# Patient Record
Sex: MALE | Race: WHITE | Employment: OTHER | URBAN - METROPOLITAN AREA
[De-identification: names, ages, dates, MRNs, and addresses within clinical notes are randomized per-mention and may not be internally consistent; named-entity substitution may affect disease eponyms.]

---

## 2019-11-11 NOTE — H&P
His cardiologist faxed a response to my request, I have sent his comments via Fax to the endoscopy department. Date: 10/18/2019 2:00 PM   Patient Name: Fern Huang. Account #: [id number]    Gender: Male    (age): 1945 (73)       Provider:     Wesly Dunaway. Shayy Quinones MD        Referring Physician:     Shun Mondragon. Lissa [address]  [phone] (phone)  [phone] (fax)        Chief Complaint: Anemia           History of Present Illness:   81-year-old male with a history of  intestines comes in with progressiveanemia. He denies mariela hematochezia or melena. His current hemoglobin is 9, 2 months ago was 11. Angiodysplasia of the intestine now with progressive anemia. Last coronary stent was more than a year ago. He does have a defibrillator and asked such a pill camera study cannot be done. We negotiated that he would have up her GI endoscopy and colonoscopy. ? 81-year-old male with a history of  intestines comes in with progressiveanemia. He denies mariela hematochezia or melena. His current hemoglobin is 9, 2 months ago was 11. Angiodysplasia of the intestine now with progressive anemia. Last coronary stent was more than a year ago. He does have a defibrillator and asked such a pill camera study cannot be done. We negotiated that he would have up her GI endoscopy and colonoscopy. ? Past Medical History      Medical Conditions: Anemia  C.O.P.D.   Congestive Heart Failure  Pacemaker  S/P Defibralltor  Sleep apnea  Uses CPAP   Surgical Procedures: Knee Surgery, 14  Neck surgery,    Dx Studies: Colonoscopy,   Colonoscopy, 14  EGD, 17  Endoscopy, 14  M2A, 11  Pre-Procedure Call, 2017  Pre-Procedure Call, 10/24/2014   Medications: Adult Low Dose Aspirin 81 mg  carvedilol 25 mg  escitalopram oxalate 10 mg  lisinopril 40 mg 1 Daily  multivitamin  pantoprazole 40 mg TAKE 1 TABLET BY MOUTH ONCE A DAY  pramipexole 0.5 mg 1 tablet 4pm and 1/2 tablet 9pm  pravastatin 40 mg  Super B Complex + C 150 mg 1 daily  Tylenol Extra Strength 500 mg 2 tablets as needed   Allergies: aspirin   Immunizations: Influenza, seasonal, injectable, 2018  Pneumococcal conjugate PCV 13, 2018  Flu vaccine      Social History      Alcohol: Alcohol consumption: Weekly. Beer 4-5 3X a week. Tobacco: Former smoker   Drugs: None   Exercise: Exercise less than 3 times a week. Walking/ Running 2 times a week. Caffeine: 2-3. Coffee or Tea # of cups per day:. Daily. Marital Status:          Occupation:               Family History No history of Colon Cancer, Colon Polyps, Esophogeal Cancer, GI Cancers, IBD (Crohn's or UC), Liver disease        Review of Systems:   Cardiovascular: Denies chest pain, irregular heart beat, palpitations, peripheral edema, syncope, Sweats. Constitutional: Denies fatigue, fever, loss of appetite, weight gain, weight loss. ENMT: Denies nose bleeds, sore throat, hearing loss. Endocrine: Denies excessive thirst, heat intolerance. Eyes: Denies loss of vision. Gastrointestinal: Denies abdominal pain, abdominal swelling, change in bowel habits, constipation, diarrhea, Bloating/gas, heartburn, jaundice, nausea, rectal bleeding, stomach cramps, vomiting, dysphagia, rectal pain, Stool incontinence, hematemesis. Genitourinary: Denies dark urine, dysuria, frequent urination, hematuria, incontinence. Hematologic/Lymphatic: Denies easy bruising, prolonged bleeding. Integumentary: Denies itching, rashes, sun sensitivity. Musculoskeletal: Denies arthritis, back pain, gout, joint pain, muscle weakness, stiffness. Neurological: Denies dizziness, fainting, frequent headaches, memory loss. Psychiatric: Denies anxiety, depression, difficulty sleeping, hallucinations, nervousness, panic attacks, paranoia. Respiratory: Presents suffers from cough. Denies dyspnea, wheezing.       Vital Signs:   BP  (mmHg)  Pulse  (ppm) Weight (lbs/oz) Height (ft/in) BMI   155/82 65 247 / 0 5 / 8 37.55      Physical Exam:   Constitutional:      Appearance: No distress, appears comfortable. Communication: Understands/receives spoken information. Skin:      Inspection: No rash, no jaundice. Head/face: Inspection: Normacephalic, atraumatic. Eyes:      Conjunctivae/lids: Normal.   Pupils/irises: Pupils equal, round and normal.   ENMT:      External: Normal.   Hearing: Normal.   Neck:      Neck: Normal appearance, trachea midline. Jugular veins: No JVD noted. Respiratory:      Effort: Normal respiratory effort, comfortable, speaks in complete sentences. Musculoskeletal:      Gait/station: normal.   Digits/nails: Normal, no spooning of nails, clubbing, or splinter hemorrhages, no clubbing, cyanosis, petechiae or other inflammatory conditions. Psychiatric:      Judgment/insight: Normal, normal judgement, normal insight. Orientation: oriented to time, space and person. Lab Results:      Test 7/10/2019 6/2/2014 Units Limits   CBC, Platelet, No Differential       Hematocrit  36.2 % 37.5 - 51   Hemoglobin  10.7 g/dL 12.6 - 17.7   MCH  22.9 pg 26.6 - 33   MCHC  29.6 g/dL 31.5 - 35.7   MCV  77 fL 79 - 97   NRBC       Platelets  302 M78R1/ - 379   RBC  4.68 x10E6/uL 4.14 - 5.8   RDW  22.0 % 12.3 - 15.4   WBC  9.3 x10E3/uL 3.4 - 10.8   Iron and TIBC       Iron 67  ug/dL 38 - 169   Iron Bind. Cap.(TIBC) 438  ug/dL 250 - 450   Iron Saturation 15  % 15 - 55   UIBC 371  ug/dL 111 - 343     Impressions: Angiodysplasia of colon  Cardiomyopathy, unspecified  Anemia due to blood loss? ? Angiodysplasia of colon? ?  ? ? Cardiomyopathy, unspecified? ?  ? ? Anemia due to blood loss? Assessment: ?         Plan: Get name of and contact his cardiologist for records of most recent ejection fraction, and ask if they would be willing to do risk assessment for upcoming endoscopy/colonoscopy. Upper Endoscopy  Colonoscopy?  ?Get name of and contact his cardiologist for records of most recent ejection fraction, and ask if they would be willing to do risk assessment for upcoming endoscopy/colonoscopy. ? ?  ? ? Upper Endoscopy? ?  ? ? Colonoscopy? Risk & Medical Necessity: The patient requires Moderate to High Severity care for this visit. Diagnosis and management options are Minimal. The amount of data reviewed and/or ordered is Minimal/None. The level of risk is Minimal.           Notes:              Vamsi Cooley. Jasen Marr MD     Electronically signed on 10/18/2019 3:11:28 PM by Vamsi Cooley. MD Wojciech Sahu.  Israel Boldenster, MRN [id number],  1945 Follow Up, 2019                                                                                                                                                        New     Modify          Delete     Delete all     Edit Wording          Sign     [email or web address]

## 2019-11-12 ENCOUNTER — ANESTHESIA EVENT (OUTPATIENT)
Dept: ENDOSCOPY | Age: 74
End: 2019-11-12
Payer: MEDICARE

## 2019-11-12 ENCOUNTER — ANESTHESIA (OUTPATIENT)
Dept: ENDOSCOPY | Age: 74
End: 2019-11-12
Payer: MEDICARE

## 2019-11-12 ENCOUNTER — HOSPITAL ENCOUNTER (OUTPATIENT)
Age: 74
Setting detail: OUTPATIENT SURGERY
Discharge: HOME | End: 2019-11-12
Attending: SPECIALIST | Admitting: SPECIALIST
Payer: MEDICARE

## 2019-11-12 VITALS
BODY MASS INDEX: 37.32 KG/M2 | HEART RATE: 64 BPM | HEIGHT: 68 IN | TEMPERATURE: 97.8 F | RESPIRATION RATE: 28 BRPM | OXYGEN SATURATION: 99 % | WEIGHT: 246.25 LBS | DIASTOLIC BLOOD PRESSURE: 77 MMHG | SYSTOLIC BLOOD PRESSURE: 158 MMHG

## 2019-11-12 PROCEDURE — 77030010936 HC CLP LIG BSC -C: Performed by: SPECIALIST

## 2019-11-12 PROCEDURE — 74011000250 HC RX REV CODE- 250: Performed by: NURSE ANESTHETIST, CERTIFIED REGISTERED

## 2019-11-12 PROCEDURE — 74011250637 HC RX REV CODE- 250/637: Performed by: PHYSICIAN ASSISTANT

## 2019-11-12 PROCEDURE — 77030021593 HC FCPS BIOP ENDOSC BSC -A: Performed by: SPECIALIST

## 2019-11-12 PROCEDURE — 76040000007: Performed by: SPECIALIST

## 2019-11-12 PROCEDURE — 76060000032 HC ANESTHESIA 0.5 TO 1 HR: Performed by: SPECIALIST

## 2019-11-12 PROCEDURE — 74011250636 HC RX REV CODE- 250/636: Performed by: NURSE ANESTHETIST, CERTIFIED REGISTERED

## 2019-11-12 PROCEDURE — 77030022875 HC PRB AR PLSM COAG ERBE -C: Performed by: SPECIALIST

## 2019-11-12 PROCEDURE — 74011250636 HC RX REV CODE- 250/636: Performed by: PHYSICIAN ASSISTANT

## 2019-11-12 PROCEDURE — 88305 TISSUE EXAM BY PATHOLOGIST: CPT

## 2019-11-12 RX ORDER — FLUMAZENIL 0.1 MG/ML
0.2 INJECTION INTRAVENOUS
Status: DISCONTINUED | OUTPATIENT
Start: 2019-11-12 | End: 2019-11-12 | Stop reason: HOSPADM

## 2019-11-12 RX ORDER — PRAMIPEXOLE DIHYDROCHLORIDE 0.25 MG/1
0.25 TABLET ORAL DAILY
COMMUNITY
End: 2022-01-01

## 2019-11-12 RX ORDER — MIDAZOLAM HYDROCHLORIDE 1 MG/ML
.25-5 INJECTION, SOLUTION INTRAMUSCULAR; INTRAVENOUS AS NEEDED
Status: DISCONTINUED | OUTPATIENT
Start: 2019-11-12 | End: 2019-11-12 | Stop reason: HOSPADM

## 2019-11-12 RX ORDER — NALOXONE HYDROCHLORIDE 0.4 MG/ML
0.4 INJECTION, SOLUTION INTRAMUSCULAR; INTRAVENOUS; SUBCUTANEOUS
Status: DISCONTINUED | OUTPATIENT
Start: 2019-11-12 | End: 2019-11-12 | Stop reason: HOSPADM

## 2019-11-12 RX ORDER — ATROPINE SULFATE 0.1 MG/ML
0.5 INJECTION INTRAVENOUS
Status: DISCONTINUED | OUTPATIENT
Start: 2019-11-12 | End: 2019-11-12 | Stop reason: HOSPADM

## 2019-11-12 RX ORDER — HYDROCHLOROTHIAZIDE 25 MG/1
25 TABLET ORAL DAILY
COMMUNITY
End: 2022-01-01

## 2019-11-12 RX ORDER — SODIUM CHLORIDE 9 MG/ML
INJECTION, SOLUTION INTRAVENOUS
Status: DISCONTINUED | OUTPATIENT
Start: 2019-11-12 | End: 2019-11-12 | Stop reason: HOSPADM

## 2019-11-12 RX ORDER — PANTOPRAZOLE SODIUM 40 MG/1
40 TABLET, DELAYED RELEASE ORAL DAILY
COMMUNITY

## 2019-11-12 RX ORDER — LOSARTAN POTASSIUM 100 MG/1
100 TABLET ORAL DAILY
COMMUNITY

## 2019-11-12 RX ORDER — LIDOCAINE HYDROCHLORIDE 20 MG/ML
INJECTION, SOLUTION EPIDURAL; INFILTRATION; INTRACAUDAL; PERINEURAL AS NEEDED
Status: DISCONTINUED | OUTPATIENT
Start: 2019-11-12 | End: 2019-11-12 | Stop reason: HOSPADM

## 2019-11-12 RX ORDER — FENTANYL CITRATE 50 UG/ML
25 INJECTION, SOLUTION INTRAMUSCULAR; INTRAVENOUS AS NEEDED
Status: DISCONTINUED | OUTPATIENT
Start: 2019-11-12 | End: 2019-11-12 | Stop reason: HOSPADM

## 2019-11-12 RX ORDER — DEXTROMETHORPHAN/PSEUDOEPHED 2.5-7.5/.8
1.2 DROPS ORAL
Status: DISCONTINUED | OUTPATIENT
Start: 2019-11-12 | End: 2019-11-12 | Stop reason: HOSPADM

## 2019-11-12 RX ORDER — CARVEDILOL 25 MG/1
25 TABLET ORAL 2 TIMES DAILY WITH MEALS
COMMUNITY

## 2019-11-12 RX ORDER — ESCITALOPRAM OXALATE 10 MG/1
10 TABLET ORAL DAILY
COMMUNITY

## 2019-11-12 RX ORDER — EPINEPHRINE 0.1 MG/ML
1 INJECTION INTRACARDIAC; INTRAVENOUS
Status: DISCONTINUED | OUTPATIENT
Start: 2019-11-12 | End: 2019-11-12 | Stop reason: HOSPADM

## 2019-11-12 RX ORDER — ASPIRIN 81 MG/1
81 TABLET ORAL DAILY
COMMUNITY

## 2019-11-12 RX ORDER — SODIUM CHLORIDE 9 MG/ML
50 INJECTION, SOLUTION INTRAVENOUS CONTINUOUS
Status: DISCONTINUED | OUTPATIENT
Start: 2019-11-12 | End: 2019-11-12 | Stop reason: HOSPADM

## 2019-11-12 RX ORDER — PROPOFOL 10 MG/ML
INJECTION, EMULSION INTRAVENOUS
Status: DISCONTINUED | OUTPATIENT
Start: 2019-11-12 | End: 2019-11-12 | Stop reason: HOSPADM

## 2019-11-12 RX ORDER — ROSUVASTATIN CALCIUM 20 MG/1
20 TABLET, COATED ORAL
COMMUNITY

## 2019-11-12 RX ORDER — PRAMIPEXOLE DIHYDROCHLORIDE 0.5 MG/1
0.5 TABLET ORAL DAILY
COMMUNITY

## 2019-11-12 RX ORDER — BISMUTH SUBSALICYLATE 262 MG
1 TABLET,CHEWABLE ORAL DAILY
COMMUNITY

## 2019-11-12 RX ORDER — ACETAMINOPHEN 500 MG
500 TABLET ORAL
COMMUNITY

## 2019-11-12 RX ORDER — PROPOFOL 10 MG/ML
INJECTION, EMULSION INTRAVENOUS AS NEEDED
Status: DISCONTINUED | OUTPATIENT
Start: 2019-11-12 | End: 2019-11-12 | Stop reason: HOSPADM

## 2019-11-12 RX ADMIN — LIDOCAINE HYDROCHLORIDE 40 MG: 20 INJECTION, SOLUTION INTRAVENOUS at 11:07

## 2019-11-12 RX ADMIN — SODIUM CHLORIDE: 900 INJECTION, SOLUTION INTRAVENOUS at 11:02

## 2019-11-12 RX ADMIN — PROPOFOL 50 MG: 10 INJECTION, EMULSION INTRAVENOUS at 11:11

## 2019-11-12 RX ADMIN — SODIUM CHLORIDE 50 ML/HR: 900 INJECTION, SOLUTION INTRAVENOUS at 10:58

## 2019-11-12 RX ADMIN — SIMETHICONE 80 MG: 20 SUSPENSION/ DROPS ORAL at 11:19

## 2019-11-12 RX ADMIN — PROPOFOL 30 MG: 10 INJECTION, EMULSION INTRAVENOUS at 11:13

## 2019-11-12 RX ADMIN — PROPOFOL 30 MG: 10 INJECTION, EMULSION INTRAVENOUS at 11:08

## 2019-11-12 RX ADMIN — PROPOFOL 130 MCG/KG/MIN: 10 INJECTION, EMULSION INTRAVENOUS at 11:07

## 2019-11-12 NOTE — PROGRESS NOTES
[employer] on original products read heart monitor and [facility]

## 2019-11-12 NOTE — ANESTHESIA PREPROCEDURE EVALUATION
Relevant Problems   No relevant active problems       Anesthetic History   No history of anesthetic complications            Review of Systems / Medical History  Patient summary reviewed and pertinent labs reviewed    Pulmonary    COPD: moderate    Sleep apnea: CPAP      Pertinent negatives: No asthma     Neuro/Psych   Within defined limits           Cardiovascular    Hypertension: well controlled              Exercise tolerance: >4 METS     GI/Hepatic/Renal               Comments: GI Bleeding Endo/Other        Obesity     Other Findings              Physical Exam    Airway  Mallampati: III  TM Distance: 4 - 6 cm  Neck ROM: normal range of motion   Mouth opening: Normal     Cardiovascular    Rhythm: regular  Rate: normal         Dental    Dentition: Bridges, Lower partial plate and Full upper dentures     Pulmonary  Breath sounds clear to auscultation               Abdominal  GI exam deferred       Other Findings            Anesthetic Plan    ASA: 3  Anesthesia type: MAC          Induction: Intravenous  Anesthetic plan and risks discussed with: Patient

## 2019-11-12 NOTE — DISCHARGE INSTRUCTIONS
[facility] RACHEL Ling, [address] [place] [address]  [phone]      November 12, 2019    Barbara Barone. YOB: 1945    COLONOSCOPY DISCHARGE INSTRUCTIONS    If there is redness at IV site you should apply warm compress to area. If redness or soreness persist contact Dr. Earline Ling' or your primary care doctor. There may be a slight amount of blood passed from the rectum. Gaseous discomfort may develop, but walking, belching will help relieve this. You may not operate a vehicle for 12 hours  You may not operate machinery or dangerous appliances for rest of today  You may not drink alcoholic beverages for 12 hours  Avoid making any critical decisions for 24 hours    DIET:  You may resume your normal diet, but some patients find that heavy or large meals may lead to indigestion or vomiting. I suggest a light meal as first food intake. MEDICATIONS:  The use of some over-the-counter pain medication may lead to bleeding after colon biopsies or polyp removal.  Tylenol (also called acetaminophen) is safe to take even if you have had colonoscopy with polyp removal.  Based on the procedure you had today you may not safely take aspirin or aspirin-like products for the next ten (10) days. Remember that Tylenol (also called acetaminophen) is safe to take after colonoscopy even if you have had biopsies or polyps removed. ACTIVITY:  You may resume your normal household activities, but it is recommended that you spend the remainder of the day resting -  avoid any strenuous activity. CALL DR. Uriel Aguilera' OFFICE IF:  Increasing pain, nausea, vomiting  Abdominal distension (swelling)  Significant new or increased bleeding (oral or rectal)  Fever/Chills  Chest pain/shortness of breath                       Additional instructions:   Hold aspirin 10 days. We found what looks like a healed ulcer in the stomach but no bleeding there. There were two AVM in the colon which we ablated/cauterized. We'll see if this helps to improve your anemia, keep taking iron and have blood testing again in 8 weeks      It was an honor to be your doctor today. Please let me or my office staff know if you have any feedback about today's procedure. Veronica Olivera MD    Colonoscopy saves lives, and can prevent colon cancer. Everyone aged 48 or older needs colonoscopy.   Tell your family and friends: get the test!

## 2019-11-12 NOTE — ROUTINE PROCESS
Lavonne Morning.  1945  [id number]    Situation:  Verbal report received from: Army Channing RN  Procedure: Procedure(s):  COLONOSCOPY  ESOPHAGOGASTRODUODENOSCOPY (EGD)  ESOPHAGOGASTRODUODENAL (EGD) BIOPSY  ENDOSCOPIC ARGON PLASMA COAGULATION  RESOLUTION CLIP    Background:    Preoperative diagnosis: ANGIODYSPLASIA OF COLON, CARDIOMYPATHY  Postoperative diagnosis: EGD- gastritis  Colon- AVM of cecum, hemorrhoids, diverticulosis     :  Dr. Sivakumar Maldonado  Assistant(s): Endoscopy Technician-1: Adriano Roche  Endoscopy RN-1: Fernando Mondragon    Specimens:   ID Type Source Tests Collected by Time Destination   1 : Duodenum Biopsy Preservative   Diana Bay MD 11/12/2019 1112 Pathology   2 : Antrum Biopsy Preservative   Diana Bay MD 11/12/2019 1113 Pathology     H. Pylori  no    Assessment:  Intra-procedure medications     Anesthesia gave intra-procedure sedation and medications, see anesthesia flow sheet yes    Intravenous fluids: NS@ KVO     Vital signs stable     Abdominal assessment: round and soft     Recommendation:  Discharge patient per MD order.   Family or Friend   Permission to share finding with family or friend yes

## 2019-11-12 NOTE — ANESTHESIA POSTPROCEDURE EVALUATION
Procedure(s):  COLONOSCOPY  ESOPHAGOGASTRODUODENOSCOPY (EGD)  ESOPHAGOGASTRODUODENAL (EGD) BIOPSY  ENDOSCOPIC ARGON PLASMA COAGULATION  RESOLUTION CLIP. MAC    Anesthesia Post Evaluation      Multimodal analgesia: multimodal analgesia not used between 6 hours prior to anesthesia start to PACU discharge  Patient location during evaluation: PACU  Patient participation: complete - patient participated  Level of consciousness: awake and alert  Pain score: 0  Airway patency: patent  Anesthetic complications: no  Cardiovascular status: acceptable  Respiratory status: acceptable  Hydration status: acceptable  Post anesthesia nausea and vomiting:  none      Vitals Value Taken Time   /72 11/12/2019 12:06 PM   Temp 36.6 °C (97.8 °F) 11/12/2019 11:41 AM   Pulse 62 11/12/2019 12:07 PM   Resp 24 11/12/2019 12:07 PM   SpO2 99 % 11/12/2019 12:07 PM   Vitals shown include unvalidated device data.

## 2019-11-12 NOTE — PERIOP NOTES
1058- Patient resting comfortably on stretcher, HOB elevated, VS stable, call bell within reach, waiting for procedure start, will continue to monitor pt.    1106- Patient will be monitored and sedated by anesthesia for their procedure. See anesthesia note. 46- Endoscope was pre-cleaned at bedside immediately following procedure by Enolia Rubinstein, tech. 1137- Patient tolerated procedure without problems. Abdomen soft and patient arousable and voices no complaints Report received from CRNA, see anesthesia note. Patient transported to endoscopy recovery area via stretcher, report given to ROXANNA Steele.
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
## 2019-11-12 NOTE — PROCEDURES
[facility] D. Percy Gowers, [place]  [phone]      2019    Esophagogastroduodenoscopy & Colonoscopy Procedure Note  Colleen Briscoe. : 1945  [facility] Medical Record Number: [id number]      Indications:    Iron deficiency anemia History of AVM colon  Referring Physician:  Adilia Carroll MD  Anesthesia/Sedation: Conscious Sedation/Moderate Sedation/MAC  Endoscopist:  Dr. Felipa Tate  Complications:  None  Estimated Blood Loss:  None    Permit:  The indications, risks, benefits and alternatives were reviewed with the patient or their decision maker who was provided an opportunity to ask questions and all questions were answered. The specific risks of esophagogastroduodenoscopy with conscious sedation were reviewed, including but not limited to anesthetic complication, bleeding, adverse drug reaction, missed lesion, infection, IV site reactions, and intestinal perforation which would lead to the need for surgical repair. Alternatives to EGD and colonoscopy including radiographic imaging, observation without testing, or laboratory testing were reviewed as well as the limitations of those alternatives discussed. After considering the options and having all their questions answered, the patient or their decision maker provided both verbal and written consent to proceed. -----------EGD------------   Procedure in Detail:  After obtaining informed consent, positioning of the patient in the left lateral decubitus position, and conduction of a pre-procedure pause or \"time out\" the endoscope was introduced into the mouth and advanced to the duodenum. A careful inspection was made, and findings or interventions are described below. Findings:   Esophagus:normal  Stomach:  There is an area in the antrum with scarring consistent with previous peptic ulcer, and in this area there is mild mucosal erythema which is biopsied with cold forceps. Duodenum/jejunum: normal.  A biopsy was taken from the duodenal mucosa for evaluation of villous atrophy or giardiasis. Hemostasis is confirmed from biopsy sites. Hemostasis confirmed from each biopsy site.    ----------Colonoscopy-----------    Procedure in Detail:  After obtaining informed consent, positioning of the patient in the left lateral decubitus position, and conduction of a pre-procedure pause or \"time out\" the endoscope was introduced into the anus and advanced to the cecum, which was identified by the ileocecal valve and appendiceal orifice. The quality of the colonic preparation was good. A careful inspection was made as the colonoscope was withdrawn, findings and interventions are described below. Findings:   Two AVM of the cecum are identified. We used argon plasma to ablate these lesions then applied an endoclip to the larger of the two for bleeding control. Hemostasis confirmed. There is diverticulosis in the sigmoid colon without complications such as bleeding, inflammatory change, or luminal narrowing. In the rectum, medium internal hemorrhoids are noted without bleeding.      ------------------------------  Specimens:    See above    Complications:   None; patient tolerated the procedure well. Impressions:  EGD:  Healed ulcer with overlying gastritis, biopsied. Colonoscopy: AVM x2 ablated. Recommendations:      - PPI daily  -Await pathology  -See if today's efforts help to improve his anemia. Thank you for entrusting me with this patient's care. Please do not hesitate to contact me with any questions or if I can be of assistance with any of your other patients' GI needs. Signed By: Luis Gastelum MD                        November 12, 2019    Surgical assistant none. Implants none unless specified.

## 2022-01-01 ENCOUNTER — PATIENT MESSAGE (OUTPATIENT)
Dept: NEUROLOGY | Age: 77
End: 2022-01-01

## 2022-01-01 ENCOUNTER — TELEPHONE (OUTPATIENT)
Dept: NEUROLOGY | Age: 77
End: 2022-01-01

## 2022-01-01 ENCOUNTER — OFFICE VISIT (OUTPATIENT)
Dept: NEUROLOGY | Age: 77
End: 2022-01-01
Payer: MEDICARE

## 2022-01-01 ENCOUNTER — HOSPITAL ENCOUNTER (OUTPATIENT)
Dept: MRI IMAGING | Age: 77
Discharge: HOME | End: 2022-05-20
Payer: MEDICARE

## 2022-01-01 VITALS
SYSTOLIC BLOOD PRESSURE: 130 MMHG | DIASTOLIC BLOOD PRESSURE: 70 MMHG | BODY MASS INDEX: 37.44 KG/M2 | HEIGHT: 68 IN | HEART RATE: 62 BPM | OXYGEN SATURATION: 95 %

## 2022-01-01 VITALS
SYSTOLIC BLOOD PRESSURE: 138 MMHG | OXYGEN SATURATION: 97 % | RESPIRATION RATE: 17 BRPM | DIASTOLIC BLOOD PRESSURE: 84 MMHG | HEART RATE: 68 BPM

## 2022-01-01 DIAGNOSIS — R41.89 COGNITIVE IMPAIRMENT: Primary | ICD-10-CM

## 2022-01-01 DIAGNOSIS — R68.89 OTHER GENERAL SYMPTOMS AND SIGNS: ICD-10-CM

## 2022-01-01 DIAGNOSIS — R41.89 COGNITIVE IMPAIRMENT: ICD-10-CM

## 2022-01-01 LAB
FOLATE SERPL-MCNC: 18.4 NG/ML
TSH SERPL DL<=0.005 MIU/L-ACNC: 3.62 UIU/ML (ref 0.45–4.5)
VIT B12 SERPL-MCNC: 701 PG/ML (ref 232–1245)

## 2022-01-01 PROCEDURE — 1101F PT FALLS ASSESS-DOCD LE1/YR: CPT | Performed by: PSYCHIATRY & NEUROLOGY

## 2022-01-01 PROCEDURE — G8417 CALC BMI ABV UP PARAM F/U: HCPCS | Performed by: PSYCHIATRY & NEUROLOGY

## 2022-01-01 PROCEDURE — 99215 OFFICE O/P EST HI 40 MIN: CPT | Performed by: PSYCHIATRY & NEUROLOGY

## 2022-01-01 PROCEDURE — 3017F COLORECTAL CA SCREEN DOC REV: CPT | Performed by: PSYCHIATRY & NEUROLOGY

## 2022-01-01 PROCEDURE — G8510 SCR DEP NEG, NO PLAN REQD: HCPCS | Performed by: PSYCHIATRY & NEUROLOGY

## 2022-01-01 PROCEDURE — 70551 MRI BRAIN STEM W/O DYE: CPT

## 2022-01-01 PROCEDURE — G8427 DOCREV CUR MEDS BY ELIG CLIN: HCPCS | Performed by: PSYCHIATRY & NEUROLOGY

## 2022-01-01 PROCEDURE — 99205 OFFICE O/P NEW HI 60 MIN: CPT | Performed by: PSYCHIATRY & NEUROLOGY

## 2022-01-01 PROCEDURE — 1123F ACP DISCUSS/DSCN MKR DOCD: CPT | Performed by: PSYCHIATRY & NEUROLOGY

## 2022-01-01 PROCEDURE — G8536 NO DOC ELDER MAL SCRN: HCPCS | Performed by: PSYCHIATRY & NEUROLOGY

## 2022-01-01 RX ORDER — DIAZEPAM 2 MG/1
TABLET ORAL
Qty: 2 TABLET | Refills: 0 | Status: SHIPPED | OUTPATIENT
Start: 2022-01-01 | End: 2022-01-01

## 2022-01-01 RX ORDER — DONEPEZIL HYDROCHLORIDE 5 MG/1
5 TABLET, FILM COATED ORAL DAILY
Qty: 30 TABLET | Refills: 3 | Status: SHIPPED | OUTPATIENT
Start: 2022-01-01
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
## 2022-05-11 NOTE — LETTER
5/11/2022    Patient: Benja Myers. YOB: 1945   Date of Visit: 5/11/2022     Shiraz De La Rosa MD  [phone]  Via Fax: [phone]    Dear Shiraz De La Rosa MD,      Thank you for referring Mr. Jacque De La Torre to [address] [place] [address] for evaluation. My notes for this consultation are attached. If you have questions, please do not hesitate to call me. I look forward to following your patient along with you.       Sincerely,    Jose Salas MD

## 2022-05-18 NOTE — TELEPHONE ENCOUNTER
From: Darwin Reid. To: Claudy Zambrano MD  Sent: 5/17/2022 4:14 PM EDT  Subject: Medicine for MRI Friday May 20th    My  Hodan Soto ( 89-) has a Brain MRI on Friday and he gets a lot of anxiety and sometimes claustrophobic. Could you order him something to calm him down? Our pharmacy is [facility] in [place], [place] on [phone].  Thank Leonardo Alves

## 2022-05-24 NOTE — TELEPHONE ENCOUNTER
Patient wife requesting Dr. Shelby Ford to please call her to discuss her  current medical condition.

## 2022-05-26 NOTE — PROGRESS NOTES
Neurology Progress Note    Patient ID:  Alfred Weber  [id number]  78 y.o.  1945      Subjective:   History:  Alfred Lynch is a 78 y.o. male ex smoker 2019 who  has a past medical history of Asthma, Heart failure ([address] Hyperlipidemia, Hypertension, and Sleep apnea who for the past 1 yr, patient noted memory issues described as short term memory issues, does not know where he is, forgetting conversations, wife takes care of finances and bills. (+) visual hallucinations -seeing people that he knows. Also has gait imbalance - Uses walker for 2 months - getting physical therapy. Was admitted at [facility] for CHF and sepsis 2-3 months ago due to infection of defibrillator Quit drinking March 2022 - 5-6 shots of whiskey and 8-9 beers/ week. Also has numbness and restlessness of both feet for 10 yrs. MMSE 26/30. Patient started Thiamine 100 mg every day with no clear benefit yet. ROS:  Per HPI-  Otherwise the remainder of ROS was negative    Social Hx  Social History     Socioeconomic History    Marital status:    Tobacco Use    Smoking status: Current Every Day Smoker    Smokeless tobacco: Never Used    Tobacco comment: 3 cigars a day    Substance and Sexual Activity    Alcohol use: Yes     Alcohol/week: 24.0 standard drinks     Types: 20 Cans of beer, 4 Shots of liquor per week    Drug use: Never       Meds:  Current Outpatient Medications on File Prior to Visit   Medication Sig Dispense Refill    rosuvastatin (CRESTOR) 20 mg tablet Take 20 mg by mouth nightly.  losartan (COZAAR) 100 mg tablet Take 100 mg by mouth daily.  escitalopram oxalate (LEXAPRO) 10 mg tablet Take 10 mg by mouth daily.  pantoprazole (PROTONIX) 40 mg tablet Take 40 mg by mouth daily.  carvedilol (COREG) 25 mg tablet Take 25 mg by mouth two (2) times daily (with meals).  ferrous fumarate/vit Bcomp,C (SUPER B COMPLEX PO) Take  by mouth daily.       multivitamin (ONE A DAY) tablet Take 1 Tab by mouth daily.  aspirin delayed-release 81 mg tablet Take 81 mg by mouth daily.  acetaminophen/diphenhydramine (TYLENOL PM PO) Take  by mouth daily. Indications: 2 pills per night      acetaminophen (TYLENOL EXTRA STRENGTH) 500 mg tablet Take 500 mg by mouth every six (6) hours as needed for Pain. Indications: 2 pills      pramipexole (MIRAPEX) 0.5 mg tablet Take 0.5 mg by mouth daily.  diazePAM (VALIUM) 2 mg tablet Take 1 tab 30 mins prior to procedure/testing. May take another 1 tab if needed (Patient not taking: Reported on 5/27/2022) 2 Tablet 0     No current facility-administered medications on file prior to visit. Imaging:    CT Results (recent):  No results found for this or any previous visit. MRI Results (recent):  Results from [place] encounter on 05/20/22    MRI BRAIN WO CONT    Narrative  Technique: sagittal T1, axial diffusion, axial T2, axial FLAIR, axial gradient,  coronal T2 images of the brain were obtained. Comparisons: None. Findings: On the diffusion weighted images, no abnormal signal is present to  indicate acute infarct. On the T2 and FLAIR images, there is very extensive high  T2 signal throughout the deep white matter, consistent with severe ischemic  microvascular disease of white matter. Focal encephalomalacia is observed in the  anterior right parietal lobe and medial left occipital lobe, consistent with  remote infarcts. Mild cerebral atrophy evident. The ventricles are normal in  size and configuration. Flow is present in the basilar and carotid arteries. The major dural sinuses are patent. The craniocervical junction is normal. The  pituitary gland is appropriate in size and configuration. The paranasal sinuses and mastoid air cells are clear. Impression  1. No acute intracranial abnormality. Specifically no evidence for acute  infarct, hemorrhage or mass effect. 2. Severe ischemic microvascular disease white matter.   3. 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 IR Results (recent):  No results found for this or any previous visit. VAS/US Results (recent):  No results found for this or any previous visit. Reviewed records in [facility] tab today    Lab Review     Office Visit on 05/11/2022   Component Date Value Ref Range Status    TSH 05/12/2022 3.620  0.450 - 4.500 uIU/mL Final    Vitamin B12 05/12/2022 701  232 - 1,245 pg/mL Final    Folate 05/12/2022 18.4  >3.0 ng/mL Final    Comment: A serum folate concentration of less than 3.1 ng/mL is  considered to represent clinical deficiency. Objective:       Exam:  Visit Vitals  /70   Pulse 62   Ht 5' 8\" (1.727 m)   SpO2 95%   BMI 37.44 kg/m²     Gen: Awake, alert, follows commands  Appropriate appearance, normal speech. Oriented to all spheres. No visual field defect on confrontation exam.  Full eyes movement, with no nystagmus, no diplopia, no ptosis. Normal gag and swallow. All remaining cranial nerves were normal  Motor: normal bulk and tone, no tremor              Strength: 5/5 all four extremities  Sensory: intact to LT, PP, vibration, and JPS  Reflexes: 2+ UE 1+ knees and ankles throughout; Down going toes  Coordination: Good FTN and HTS  Gait: wide based ataxic gait    Assessment:       ICD-10-CM ICD-9-CM    1. Cognitive impairment  R41.89 294.9 REFERRAL TO NEUROPSYCHOLOGY     Possible vascular dementia    Chronic alcohol use    His recent sepsis and history of COPD and CHF may have played a role. MRI brain: Severe ischemic microvascular disease white matter. 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 Plan:   1. I personally reviewed the MRI brain images with the patient  2. Trial of Aricept 5 mg every day for possible vascular dementia  3. Will refer for neuropsych testing c/o Dr Clemens Rank  4. Continue Thiamine 100 mg every day   5. Already getting physical therapy for gait  6.  Already on ASA 81 every day  7. Follow up with cardiologist regarding CHF and pulmonologist regarding COPD         Follow-up and Dispositions    · Return in about 1 month (around 6/27/2022).                Mai Troncoso MD  Diplomate, American Board of Psychiatry and Neurology  Diplomate, Neuromuscular Medicine  Diplomate, American Board of Electrodiagnostic Medicine

## 2022-05-26 NOTE — TELEPHONE ENCOUNTER
[employer]. [facility] verified. Pt scheduled with Dr. Nery Dumont for 5/27/22 at 1:20pm to discuss results.

## 2022-05-27 NOTE — LETTER
5/27/2022    Patient: Hyacinth Daniel. YOB: 1945   Date of Visit: 5/27/2022     Karli Smallwood MD  [facility] [phone]  Via Fax: [phone]    Dear Karli Smallwood MD,      Thank you for referring Mr. Gautam Gallegos to [facility] for evaluation. My notes for this consultation are attached. If you have questions, please do not hesitate to call me. I look forward to following your patient along with you.       Sincerely,    Akbar Pack MD

## 2022-07-08 ENCOUNTER — TELEPHONE (OUTPATIENT)
Dept: NEUROLOGY | Age: 77
End: 2022-07-08

## (undated) DEVICE — NDL FLTR TIP 5 MIC 18GX1.5IN --

## (undated) DEVICE — 1200 GUARD II KIT W/5MM TUBE W/O VAC TUBE: Brand: GUARDIAN

## (undated) DEVICE — FIAPC® PROBE W/ FILTER 2200 C OD 2.3MM/6.9FR; L 2.2M/7.2FT: Brand: ERBE

## (undated) DEVICE — SYR 3ML LL TIP 1/10ML GRAD --

## (undated) DEVICE — REM POLYHESIVE ADULT PATIENT RETURN ELECTRODE: Brand: VALLEYLAB

## (undated) DEVICE — NDL PRT INJ NSAF BLNT 18GX1.5 --

## (undated) DEVICE — SET ADMIN 16ML TBNG L100IN 2 Y INJ SITE IV PIGGY BK DISP

## (undated) DEVICE — ELECTRODE,RADIOTRANSLUCENT,FOAM,3PK: Brand: MEDLINE

## (undated) DEVICE — SYR 5ML 1/5 GRAD LL NSAF LF --

## (undated) DEVICE — FIAPC® PROBE W/ FILTER 2200 A OD 2.3MM/6.9FR; L 2.2M/7.2FT: Brand: ERBE

## (undated) DEVICE — BITEBLOCK ENDOSCP 60FR MAXI WHT POLYETH STURDY W/ VELC WVN

## (undated) DEVICE — BAG SPEC BIOHZRD 10 X 10 IN --

## (undated) DEVICE — CATH IV AUTOGRD BC BLU 22GA 25 -- INSYTE

## (undated) DEVICE — SOLIDIFIER MEDC 1200ML -- CONVERT TO 356117

## (undated) DEVICE — BAG BELONG PT PERS CLEAR HANDL

## (undated) DEVICE — FORCEPS BX L240CM JAW DIA2.8MM L CAP W/ NDL MIC MESH TOOTH

## (undated) DEVICE — KIT COLON W/ 1.1OZ LUB AND 2 END

## (undated) DEVICE — CONTAINER SPEC 20 ML LID NEUT BUFF FORMALIN 10 % POLYPR STS

## (undated) DEVICE — CANNULA CUSH AD W/ 14FT TBG

## (undated) DEVICE — Device

## (undated) DEVICE — BASIN EMSIS 16OZ GRAPHITE PLAS KID SHP MOLD GRAD FOR ORAL